# Patient Record
Sex: MALE | Race: WHITE | NOT HISPANIC OR LATINO | Employment: FULL TIME | URBAN - METROPOLITAN AREA
[De-identification: names, ages, dates, MRNs, and addresses within clinical notes are randomized per-mention and may not be internally consistent; named-entity substitution may affect disease eponyms.]

---

## 2023-10-13 ENCOUNTER — TELEPHONE (OUTPATIENT)
Dept: FAMILY MEDICINE CLINIC | Facility: CLINIC | Age: 38
End: 2023-10-13
Payer: COMMERCIAL

## 2023-10-13 NOTE — TELEPHONE ENCOUNTER
Caller: NADIA HARPER    Relationship:     Best call back number:   NADIA HARPER [phone]       What was the call regarding: WANTED TO KNOW IF DR MARTINEZ WOULD TAKE HIM AS A NEW PATIENT / HIS BLOOD PRESSURE HAS BEEN VERY HIGH AND NEEDS TO MAKE APPT     OVER 200S/100S     Is it okay if the provider responds through MyChart: CALL PLEASE ADVISE

## 2023-10-13 NOTE — TELEPHONE ENCOUNTER
NEXT APPOINTMENT  11/8 NEW PATIENT  1:30 PM (in 3 weeks)  PRAFUL LARIOS      ADVISED UC IN THE MEANTIME DUE TO HOW FAR APPT IS OUT